# Patient Record
Sex: FEMALE | Race: WHITE | NOT HISPANIC OR LATINO | Employment: OTHER | URBAN - NONMETROPOLITAN AREA
[De-identification: names, ages, dates, MRNs, and addresses within clinical notes are randomized per-mention and may not be internally consistent; named-entity substitution may affect disease eponyms.]

---

## 2024-06-20 ENCOUNTER — OFFICE VISIT (OUTPATIENT)
Dept: INTERNAL MEDICINE | Facility: CLINIC | Age: 69
End: 2024-06-20
Payer: MEDICARE

## 2024-06-20 ENCOUNTER — TELEPHONE (OUTPATIENT)
Dept: INTERNAL MEDICINE | Facility: CLINIC | Age: 69
End: 2024-06-20

## 2024-06-20 VITALS
HEART RATE: 69 BPM | OXYGEN SATURATION: 99 % | BODY MASS INDEX: 28.58 KG/M2 | TEMPERATURE: 97.7 F | HEIGHT: 61 IN | SYSTOLIC BLOOD PRESSURE: 158 MMHG | DIASTOLIC BLOOD PRESSURE: 98 MMHG | WEIGHT: 151.4 LBS

## 2024-06-20 DIAGNOSIS — F33.9 RECURRENT MAJOR DEPRESSIVE DISORDER, REMISSION STATUS UNSPECIFIED: Primary | ICD-10-CM

## 2024-06-20 DIAGNOSIS — N18.31 STAGE 3A CHRONIC KIDNEY DISEASE: ICD-10-CM

## 2024-06-20 DIAGNOSIS — F41.1 GENERALIZED ANXIETY DISORDER: ICD-10-CM

## 2024-06-20 DIAGNOSIS — R41.3 MEMORY LOSS: ICD-10-CM

## 2024-06-20 DIAGNOSIS — H93.13 TINNITUS OF BOTH EARS: ICD-10-CM

## 2024-06-20 DIAGNOSIS — G47.9 SLEEP DISORDER: ICD-10-CM

## 2024-06-20 DIAGNOSIS — I10 ESSENTIAL HYPERTENSION: ICD-10-CM

## 2024-06-20 DIAGNOSIS — Z12.11 ENCOUNTER FOR SCREENING FOR MALIGNANT NEOPLASM OF COLON: ICD-10-CM

## 2024-06-20 PROBLEM — F32.9 MAJOR DEPRESSIVE DISORDER: Status: ACTIVE | Noted: 2024-06-20

## 2024-06-20 PROCEDURE — 1126F AMNT PAIN NOTED NONE PRSNT: CPT | Performed by: INTERNAL MEDICINE

## 2024-06-20 PROCEDURE — 3080F DIAST BP >= 90 MM HG: CPT | Performed by: INTERNAL MEDICINE

## 2024-06-20 PROCEDURE — 3077F SYST BP >= 140 MM HG: CPT | Performed by: INTERNAL MEDICINE

## 2024-06-20 PROCEDURE — 99214 OFFICE O/P EST MOD 30 MIN: CPT | Performed by: INTERNAL MEDICINE

## 2024-06-20 RX ORDER — CLONIDINE HYDROCHLORIDE 0.1 MG/1
TABLET ORAL
COMMUNITY

## 2024-06-20 RX ORDER — SERTRALINE HYDROCHLORIDE 25 MG/1
25 TABLET, FILM COATED ORAL DAILY
Qty: 14 TABLET | Refills: 0 | Status: SHIPPED | OUTPATIENT
Start: 2024-06-20

## 2024-06-20 RX ORDER — GLYCOPYRROLATE 1 MG/1
TABLET ORAL
COMMUNITY

## 2024-06-20 RX ORDER — ERGOCALCIFEROL 1.25 MG/1
CAPSULE ORAL
COMMUNITY
Start: 2024-05-06

## 2024-06-20 RX ORDER — BUSPIRONE HYDROCHLORIDE 5 MG/1
TABLET ORAL
COMMUNITY

## 2024-06-20 RX ORDER — SERTRALINE HYDROCHLORIDE 25 MG/1
25 TABLET, FILM COATED ORAL DAILY
Qty: 14 TABLET | Refills: 0 | Status: SHIPPED | OUTPATIENT
Start: 2024-06-20 | End: 2024-06-20

## 2024-06-20 RX ORDER — LOSARTAN POTASSIUM 25 MG/1
1 TABLET ORAL DAILY
COMMUNITY

## 2024-06-20 RX ORDER — BUDESONIDE, GLYCOPYRROLATE, AND FORMOTEROL FUMARATE 160; 9; 4.8 UG/1; UG/1; UG/1
2 AEROSOL, METERED RESPIRATORY (INHALATION) 2 TIMES DAILY
Qty: 10.7 G | Refills: 2 | Status: SHIPPED | OUTPATIENT
Start: 2024-06-20

## 2024-06-26 ENCOUNTER — TELEPHONE (OUTPATIENT)
Dept: INTERNAL MEDICINE | Facility: CLINIC | Age: 69
End: 2024-06-26

## 2024-07-10 ENCOUNTER — TELEPHONE (OUTPATIENT)
Dept: INTERNAL MEDICINE | Facility: CLINIC | Age: 69
End: 2024-07-10

## 2024-07-10 DIAGNOSIS — H93.13 TINNITUS OF BOTH EARS: Primary | ICD-10-CM

## 2024-07-18 ENCOUNTER — OFFICE VISIT (OUTPATIENT)
Dept: INTERNAL MEDICINE | Facility: CLINIC | Age: 69
End: 2024-07-18
Payer: MEDICARE

## 2024-07-18 VITALS
WEIGHT: 151.6 LBS | OXYGEN SATURATION: 99 % | HEART RATE: 57 BPM | HEIGHT: 61 IN | TEMPERATURE: 97.3 F | DIASTOLIC BLOOD PRESSURE: 86 MMHG | BODY MASS INDEX: 28.62 KG/M2 | SYSTOLIC BLOOD PRESSURE: 142 MMHG

## 2024-07-18 DIAGNOSIS — F41.1 GENERALIZED ANXIETY DISORDER: ICD-10-CM

## 2024-07-18 DIAGNOSIS — I10 ESSENTIAL HYPERTENSION: ICD-10-CM

## 2024-07-18 DIAGNOSIS — N18.31 STAGE 3A CHRONIC KIDNEY DISEASE: Primary | ICD-10-CM

## 2024-07-18 DIAGNOSIS — F33.9 RECURRENT MAJOR DEPRESSIVE DISORDER, REMISSION STATUS UNSPECIFIED: ICD-10-CM

## 2024-07-18 DIAGNOSIS — R41.3 MEMORY LOSS: ICD-10-CM

## 2024-07-18 PROCEDURE — 1126F AMNT PAIN NOTED NONE PRSNT: CPT | Performed by: INTERNAL MEDICINE

## 2024-07-18 PROCEDURE — 3077F SYST BP >= 140 MM HG: CPT | Performed by: INTERNAL MEDICINE

## 2024-07-18 PROCEDURE — 3079F DIAST BP 80-89 MM HG: CPT | Performed by: INTERNAL MEDICINE

## 2024-07-18 PROCEDURE — G2211 COMPLEX E/M VISIT ADD ON: HCPCS | Performed by: INTERNAL MEDICINE

## 2024-07-18 PROCEDURE — 99214 OFFICE O/P EST MOD 30 MIN: CPT | Performed by: INTERNAL MEDICINE

## 2024-07-18 RX ORDER — GLYCOPYRROLATE 1 MG/1
1 TABLET ORAL 3 TIMES DAILY PRN
Qty: 90 TABLET | Refills: 0 | Status: SHIPPED | OUTPATIENT
Start: 2024-07-18

## 2024-07-18 RX ORDER — LOSARTAN POTASSIUM 25 MG/1
25 TABLET ORAL 2 TIMES DAILY
Qty: 60 TABLET | Refills: 2 | Status: SHIPPED | OUTPATIENT
Start: 2024-07-18

## 2024-09-09 ENCOUNTER — OFFICE VISIT (OUTPATIENT)
Dept: INTERNAL MEDICINE | Facility: CLINIC | Age: 69
End: 2024-09-09
Payer: MEDICARE

## 2024-09-09 VITALS
DIASTOLIC BLOOD PRESSURE: 72 MMHG | TEMPERATURE: 98.2 F | HEIGHT: 61 IN | WEIGHT: 154 LBS | OXYGEN SATURATION: 99 % | HEART RATE: 73 BPM | BODY MASS INDEX: 29.07 KG/M2 | SYSTOLIC BLOOD PRESSURE: 142 MMHG

## 2024-09-09 DIAGNOSIS — J06.9 ACUTE UPPER RESPIRATORY INFECTION: Primary | ICD-10-CM

## 2024-09-09 DIAGNOSIS — I10 ESSENTIAL HYPERTENSION: ICD-10-CM

## 2024-09-09 DIAGNOSIS — J30.1 SEASONAL ALLERGIC RHINITIS DUE TO POLLEN: ICD-10-CM

## 2024-09-09 DIAGNOSIS — N18.31 STAGE 3A CHRONIC KIDNEY DISEASE: ICD-10-CM

## 2024-09-09 PROCEDURE — 3078F DIAST BP <80 MM HG: CPT | Performed by: INTERNAL MEDICINE

## 2024-09-09 PROCEDURE — 96372 THER/PROPH/DIAG INJ SC/IM: CPT | Performed by: INTERNAL MEDICINE

## 2024-09-09 PROCEDURE — 3077F SYST BP >= 140 MM HG: CPT | Performed by: INTERNAL MEDICINE

## 2024-09-09 PROCEDURE — 87426 SARSCOV CORONAVIRUS AG IA: CPT | Performed by: INTERNAL MEDICINE

## 2024-09-09 PROCEDURE — 99214 OFFICE O/P EST MOD 30 MIN: CPT | Performed by: INTERNAL MEDICINE

## 2024-09-09 PROCEDURE — 1126F AMNT PAIN NOTED NONE PRSNT: CPT | Performed by: INTERNAL MEDICINE

## 2024-09-09 RX ORDER — FLUTICASONE PROPIONATE 50 MCG
2 SPRAY, SUSPENSION (ML) NASAL DAILY
Qty: 9.9 G | Refills: 2 | Status: SHIPPED | OUTPATIENT
Start: 2024-09-09

## 2024-09-09 RX ORDER — AZITHROMYCIN 250 MG/1
TABLET, FILM COATED ORAL
Qty: 6 TABLET | Refills: 0 | Status: SHIPPED | OUTPATIENT
Start: 2024-09-09

## 2024-09-09 RX ORDER — METHYLPREDNISOLONE 4 MG
TABLET, DOSE PACK ORAL
Qty: 21 TABLET | Refills: 0 | Status: SHIPPED | OUTPATIENT
Start: 2024-09-09

## 2024-09-09 RX ORDER — METHYLPREDNISOLONE SODIUM SUCCINATE 40 MG/ML
40 INJECTION, POWDER, LYOPHILIZED, FOR SOLUTION INTRAMUSCULAR; INTRAVENOUS ONCE
Status: COMPLETED | OUTPATIENT
Start: 2024-09-09 | End: 2024-09-09

## 2024-09-09 RX ADMIN — METHYLPREDNISOLONE SODIUM SUCCINATE 40 MG: 40 INJECTION, POWDER, LYOPHILIZED, FOR SOLUTION INTRAMUSCULAR; INTRAVENOUS at 07:08

## 2024-09-10 LAB
EXPIRATION DATE: NORMAL
INTERNAL CONTROL: NORMAL
Lab: NORMAL
SARS-COV-2 AG UPPER RESP QL IA.RAPID: NOT DETECTED

## 2024-10-23 ENCOUNTER — OFFICE VISIT (OUTPATIENT)
Dept: INTERNAL MEDICINE | Facility: CLINIC | Age: 69
End: 2024-10-23
Payer: MEDICARE

## 2024-10-23 VITALS
HEART RATE: 72 BPM | BODY MASS INDEX: 29.27 KG/M2 | SYSTOLIC BLOOD PRESSURE: 170 MMHG | OXYGEN SATURATION: 97 % | WEIGHT: 155 LBS | DIASTOLIC BLOOD PRESSURE: 98 MMHG | TEMPERATURE: 98.4 F | HEIGHT: 61 IN

## 2024-10-23 DIAGNOSIS — Z78.0 POST-MENOPAUSAL: ICD-10-CM

## 2024-10-23 DIAGNOSIS — Z12.31 ENCOUNTER FOR SCREENING MAMMOGRAM FOR BREAST CANCER: ICD-10-CM

## 2024-10-23 DIAGNOSIS — N18.31 STAGE 3A CHRONIC KIDNEY DISEASE: ICD-10-CM

## 2024-10-23 DIAGNOSIS — Z23 NEED FOR PNEUMOCOCCAL 20-VALENT CONJUGATE VACCINATION: ICD-10-CM

## 2024-10-23 DIAGNOSIS — F33.9 RECURRENT MAJOR DEPRESSIVE DISORDER, REMISSION STATUS UNSPECIFIED: ICD-10-CM

## 2024-10-23 DIAGNOSIS — I10 ESSENTIAL HYPERTENSION: ICD-10-CM

## 2024-10-23 DIAGNOSIS — G47.9 SLEEP DISORDER: ICD-10-CM

## 2024-10-23 DIAGNOSIS — Z23 FLU VACCINE NEED: Primary | ICD-10-CM

## 2024-10-23 PROCEDURE — 1170F FXNL STATUS ASSESSED: CPT | Performed by: INTERNAL MEDICINE

## 2024-10-23 PROCEDURE — 3077F SYST BP >= 140 MM HG: CPT | Performed by: INTERNAL MEDICINE

## 2024-10-23 PROCEDURE — 90662 IIV NO PRSV INCREASED AG IM: CPT | Performed by: INTERNAL MEDICINE

## 2024-10-23 PROCEDURE — G0439 PPPS, SUBSEQ VISIT: HCPCS | Performed by: INTERNAL MEDICINE

## 2024-10-23 PROCEDURE — G0008 ADMIN INFLUENZA VIRUS VAC: HCPCS | Performed by: INTERNAL MEDICINE

## 2024-10-23 PROCEDURE — 90677 PCV20 VACCINE IM: CPT | Performed by: INTERNAL MEDICINE

## 2024-10-23 PROCEDURE — G0009 ADMIN PNEUMOCOCCAL VACCINE: HCPCS | Performed by: INTERNAL MEDICINE

## 2024-10-23 PROCEDURE — 99213 OFFICE O/P EST LOW 20 MIN: CPT | Performed by: INTERNAL MEDICINE

## 2024-10-23 PROCEDURE — 1126F AMNT PAIN NOTED NONE PRSNT: CPT | Performed by: INTERNAL MEDICINE

## 2024-10-23 PROCEDURE — 3080F DIAST BP >= 90 MM HG: CPT | Performed by: INTERNAL MEDICINE

## 2024-10-23 RX ORDER — TEMAZEPAM 7.5 MG/1
7.5 CAPSULE ORAL NIGHTLY PRN
Qty: 30 CAPSULE | Refills: 2 | Status: SHIPPED | OUTPATIENT
Start: 2024-10-23

## 2024-11-06 ENCOUNTER — HOSPITAL ENCOUNTER (OUTPATIENT)
Dept: MAMMOGRAPHY | Facility: HOSPITAL | Age: 69
Discharge: HOME | End: 2024-11-06
Payer: MEDICARE

## 2024-11-06 ENCOUNTER — HOSPITAL ENCOUNTER (OUTPATIENT)
Dept: BONE DENSITY | Facility: HOSPITAL | Age: 69
Discharge: HOME | End: 2024-11-06
Payer: MEDICARE

## 2024-11-06 DIAGNOSIS — Z12.31 ENCOUNTER FOR SCREENING MAMMOGRAM FOR BREAST CANCER: ICD-10-CM

## 2024-11-06 DIAGNOSIS — Z78.0 POST-MENOPAUSAL: ICD-10-CM

## 2024-11-06 PROCEDURE — 77063 BREAST TOMOSYNTHESIS BI: CPT

## 2024-11-06 PROCEDURE — 77067 SCR MAMMO BI INCL CAD: CPT

## 2024-11-06 PROCEDURE — 77080 DXA BONE DENSITY AXIAL: CPT

## 2025-01-09 DIAGNOSIS — I10 ESSENTIAL HYPERTENSION: ICD-10-CM

## 2025-01-09 RX ORDER — LOSARTAN POTASSIUM 25 MG/1
25 TABLET ORAL 2 TIMES DAILY
Qty: 180 TABLET | Refills: 2 | Status: SHIPPED | OUTPATIENT
Start: 2025-01-09

## 2025-01-31 ENCOUNTER — TELEPHONE (OUTPATIENT)
Dept: INTERNAL MEDICINE | Facility: CLINIC | Age: 70
End: 2025-01-31

## 2025-01-31 DIAGNOSIS — I10 ESSENTIAL HYPERTENSION: Primary | ICD-10-CM

## 2025-02-03 LAB
ALBUMIN SERPL-MCNC: 4.3 G/DL (ref 3.5–5.2)
ALBUMIN/GLOB SERPL: 1.6 G/DL
ALP SERPL-CCNC: 96 U/L (ref 39–117)
ALT SERPL-CCNC: 20 U/L (ref 1–33)
AST SERPL-CCNC: 20 U/L (ref 1–32)
BILIRUB SERPL-MCNC: 0.5 MG/DL (ref 0–1.2)
BUN SERPL-MCNC: 13 MG/DL (ref 8–23)
BUN/CREAT SERPL: 10.7 (ref 7–25)
CALCIUM SERPL-MCNC: 9.9 MG/DL (ref 8.6–10.5)
CHLORIDE SERPL-SCNC: 103 MMOL/L (ref 98–107)
CO2 SERPL-SCNC: 26.4 MMOL/L (ref 22–29)
CREAT SERPL-MCNC: 1.21 MG/DL (ref 0.57–1)
EGFRCR SERPLBLD CKD-EPI 2021: 48.3 ML/MIN/1.73
GLOBULIN SER CALC-MCNC: 2.7 GM/DL
GLUCOSE SERPL-MCNC: 84 MG/DL (ref 65–99)
POTASSIUM SERPL-SCNC: 4.6 MMOL/L (ref 3.5–5.2)
PROT SERPL-MCNC: 7 G/DL (ref 6–8.5)
SODIUM SERPL-SCNC: 141 MMOL/L (ref 136–145)

## 2025-02-05 ENCOUNTER — OFFICE VISIT (OUTPATIENT)
Dept: INTERNAL MEDICINE | Facility: CLINIC | Age: 70
End: 2025-02-05
Payer: MEDICARE

## 2025-02-05 VITALS
TEMPERATURE: 97.8 F | BODY MASS INDEX: 28.7 KG/M2 | WEIGHT: 152 LBS | OXYGEN SATURATION: 98 % | HEIGHT: 61 IN | HEART RATE: 59 BPM | DIASTOLIC BLOOD PRESSURE: 98 MMHG | SYSTOLIC BLOOD PRESSURE: 186 MMHG

## 2025-02-05 DIAGNOSIS — F33.9 RECURRENT MAJOR DEPRESSIVE DISORDER, REMISSION STATUS UNSPECIFIED: ICD-10-CM

## 2025-02-05 DIAGNOSIS — T83.711D EROSION OF VAGINAL MESH, SUBSEQUENT ENCOUNTER: ICD-10-CM

## 2025-02-05 DIAGNOSIS — I10 ESSENTIAL HYPERTENSION: ICD-10-CM

## 2025-02-05 DIAGNOSIS — N18.31 STAGE 3A CHRONIC KIDNEY DISEASE: ICD-10-CM

## 2025-02-05 DIAGNOSIS — F41.1 GENERALIZED ANXIETY DISORDER: Primary | ICD-10-CM

## 2025-02-05 DIAGNOSIS — R94.39 ABNORMAL STRESS TEST: ICD-10-CM

## 2025-02-05 PROCEDURE — 3077F SYST BP >= 140 MM HG: CPT | Performed by: INTERNAL MEDICINE

## 2025-02-05 PROCEDURE — 1126F AMNT PAIN NOTED NONE PRSNT: CPT | Performed by: INTERNAL MEDICINE

## 2025-02-05 PROCEDURE — 3080F DIAST BP >= 90 MM HG: CPT | Performed by: INTERNAL MEDICINE

## 2025-02-05 PROCEDURE — G2211 COMPLEX E/M VISIT ADD ON: HCPCS | Performed by: INTERNAL MEDICINE

## 2025-02-05 PROCEDURE — 99214 OFFICE O/P EST MOD 30 MIN: CPT | Performed by: INTERNAL MEDICINE

## 2025-02-06 NOTE — PROGRESS NOTES
"      Chief Complaint  Hypertension (3 month follow up ), needs referral for cardiology  (Due to abnormal stress test done in 2023 in [place] /Was told she may have a blockage ), can sleeping medicaiton be increased , wants referral for psyc  (Does not want to go to [place] ), other (Bowel  movements come though vagina more so than rectum. Has been at least 3 years since seeing gyn/Is ok with [place] - as that is who she has seen before ), and Shoulder Injury (Right shoulder pain )    Subjective        Cindy SARAH presents to [facility]    HPI    Patient here for the above problems.  See Assessment and Plan for further HPI components.      Review of Systems    Objective   Vital Signs:  BP (!) 186/98 (BP Location: Left arm, Patient Position: Sitting, Cuff Size: Adult) Comment: has not had BP medication this morning  Pulse 59   Temp 97.8 °F (36.6 °C) (Temporal)   Ht 154.9 cm (61\")   Wt 68.9 kg (152 lb)   SpO2 98%   BMI 28.72 kg/m²   Estimated body mass index is 28.72 kg/m² as calculated from the following:    Height as of this encounter: 154.9 cm (61\").    Weight as of this encounter: 68.9 kg (152 lb).      Physical Exam  Vitals and nursing note reviewed.   Constitutional:       Appearance: She is not ill-appearing.   Eyes:      General: No scleral icterus.     Conjunctiva/sclera: Conjunctivae normal.   Pulmonary:      Effort: Pulmonary effort is normal. No respiratory distress.   Skin:     General: Skin is warm.      Coloration: Skin is not pale.   Neurological:      General: No focal deficit present.      Mental Status: She is alert and oriented to person, place, and time.   Psychiatric:         Mood and Affect: Mood normal.         Behavior: Behavior normal.                       Assessment and Plan   Diagnoses and all orders for this visit:    1. Generalized anxiety disorder (Primary)  -     Ambulatory Referral to Psychiatry    2. Recurrent major depressive disorder, " "remission status unspecified  -     Ambulatory Referral to Psychiatry    3. Abnormal stress test  -     Ambulatory Referral to Cardiology    4. Erosion of vaginal mesh, subsequent encounter  -     Ambulatory Referral to Obstetrics / Gynecology    5. Stage 3a chronic kidney disease    6. Essential hypertension        History of Present Illness  The patient is a 69-year-old female who presents for evaluation of chest pain, blood pressure management, stress level, and stool coming out of the vagina.    She underwent a stress test at [facility] in [place], [place], under the supervision of Dr. Morrison. The results indicated \"some abnormalities, suggesting a potential blockage\", even though she \"did not have a heart attack.\" She was advised to consult a cardiologist or undergo further testing but opted to return home.  She has not told anyone about this until today, will try to get the records from this. She has expressed a preference for Dr. Kebede, given his previous successful treatment of her  and nephew. She reports intermittent chest discomfort, similar to the symptoms experienced during her hospital stay. On a few occasions, she contemplated seeking emergency care but decided against it as the discomfort subsided after resting in a chair. She is not experiencing any chest pain currently but admits to occasional shortness of breath.    She has been experiencing an unusual symptom of stool passing through her vagina, which has occurred three times this month. She has previously consulted with Dr. Danielle, who referred her to [facility] for surgery. Post-surgery, she experienced chest pain, which alleviated the pain. She was subsequently advised to undergo a stress test the following day. She has also consulted with Dr. العراقي, a gynecologist, on two occasions, once as an outpatient and once in his office.  She reports she thinks she was supposed to get a second surgery but the " stress test put a hold on that.      She admits to forgetting her blood pressure medication this morning, a lapse that occurs every other day. She manages her pain without resorting to ibuprofen or Aleve, occasionally using Tylenol, which provides minimal relief. She acknowledges a need to increase her water intake.    She is seeking a referral to a psychiatrist to help manage her stress levels. She reports difficulty sleeping due to an inability to quiet her mind. 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    MEDICATIONS  Current: Tylenol      Assessment & Plan  1. Chest pain.  The patient reports intermittent chest pain, similar to previous episodes that resolved without emergency intervention. A recent stress test at [facility] indicated abnormalities suggestive of a possible blockage. A referral to cardiology will be initiated, preferably to Dr. Kebede, for further evaluation and management. The stress test results will be obtained and reviewed.  Will refer to Dr. Kebede per patient request and story of abnormal stress test.     2. Stool coming out of the vagina. Abnormal mesh.   The patient reports experiencing stool passing through the vagina intermittently, with three episodes this month. A referral to Dr. العراقي, a gynecologist, will be made for further evaluation and management.    3. Blood pressure management. Hypertension. CKD 3a.  The patient's kidney function is deteriorating, potentially due to uncontrolled blood pressure. She admits to frequently forgetting to take her blood pressure medication. She is advised to ensure consistent intake of her blood pressure medication and to increase her water consumption. A copy of her recent kidney function tests will be provided for her records.  Poor compliance with medications.  Focus on hydration.   She follows with nephrology.     4. Stress level.  The patient reports high stress levels, difficulty sleeping, and financial strain. She is advised to seek psychiatric support to help manage her stress. Recommendations include contacting [facility] or [facility] for psychiatric services. Increasing sleep medication is not recommended due to worsening kidney function.    5. Insomnia  Due to her memory issues and decline in renal function will not increase restoril as she has requested.      PROCEDURE  The patient underwent bladder surgery at [facility].        Result Review :  The following data was reviewed by: David Ferris MD on 02/05/2025:  CMP          10/21/2024    07:34 2/3/2025    07:44   CMP   Glucose 91  84    BUN 9  13    Creatinine 1.25  1.21    Sodium 141  141    Potassium 4.7  4.6    Chloride 104  103    Calcium 9.9  9.9    Total Protein 6.7  7.0    Albumin 4.2  4.3    Globulin 2.5  2.7    Total Bilirubin 0.8  0.5    Alkaline Phosphatase 87  96    AST (SGOT) 20  20    ALT (SGPT) 15  20    BUN/Creatinine Ratio 7.2  10.7      CBC w/diff          10/21/2024    07:34   CBC w/Diff   WBC 5.70    RBC 4.91    Hemoglobin 16.0    Hematocrit 45.9    MCV 93.5    MCH 32.6    MCHC 34.9    RDW 12.1    Platelets 247    Neutrophil Rel % 57.7    Lymphocyte Rel % 26.8    Monocyte Rel % 8.8    Eosinophil Rel % 5.1    Basophil Rel % 1.4      Lipid Panel          10/21/2024    07:34   Lipid Panel   Total Cholesterol 215    Triglycerides 83    HDL Cholesterol 112    VLDL Cholesterol 14    LDL Cholesterol  89      TSH          10/21/2024    07:34   TSH   TSH 3.140          UA          10/21/2024    07:34   Urinalysis   Blood, UA Negative    Leukocytes, UA Negative    Nitrite, UA Negative    RBC, UA 0-2    Bacteria, UA Comment                               Follow Up   Return in about 3 months (around 5/5/2025), or if symptoms worsen or fail to improve, for follow up for above problems. Longitudinal  care..  Patient was given instructions and counseling regarding her condition or for health maintenance advice. Please see specific information pulled into the AVS if appropriate.       ROSALINDA Ferris MD, FACP, [facility]      Electronically signed by David Ferris MD, 02/06/25, 1:02 PM CST.    Patient or patient representative verbalized consent for the use of Ambient Listening during the visit with  David Ferris MD for chart documentation. 2/6/2025  13:05 CST

## 2025-02-17 ENCOUNTER — TELEPHONE (OUTPATIENT)
Dept: INTERNAL MEDICINE | Facility: CLINIC | Age: 70
End: 2025-02-17
Payer: MEDICARE

## 2025-02-17 NOTE — TELEPHONE ENCOUNTER
Patient returned call during lunch.  She is going to work at 12:30 so if she is in a place she cannot  your return call, she will call back tomorrow

## 2025-03-28 ENCOUNTER — OFFICE VISIT (OUTPATIENT)
Dept: CARDIOLOGY | Facility: CLINIC | Age: 70
End: 2025-03-28
Payer: MEDICARE

## 2025-03-28 VITALS
HEART RATE: 68 BPM | WEIGHT: 152 LBS | BODY MASS INDEX: 28.7 KG/M2 | DIASTOLIC BLOOD PRESSURE: 100 MMHG | HEIGHT: 61 IN | OXYGEN SATURATION: 100 % | SYSTOLIC BLOOD PRESSURE: 180 MMHG

## 2025-03-28 DIAGNOSIS — I10 PRIMARY HYPERTENSION: ICD-10-CM

## 2025-03-28 DIAGNOSIS — R07.89 ATYPICAL CHEST PAIN: Primary | ICD-10-CM

## 2025-03-28 PROCEDURE — 1160F RVW MEDS BY RX/DR IN RCRD: CPT | Performed by: INTERNAL MEDICINE

## 2025-03-28 PROCEDURE — 93000 ELECTROCARDIOGRAM COMPLETE: CPT | Performed by: INTERNAL MEDICINE

## 2025-03-28 PROCEDURE — 3077F SYST BP >= 140 MM HG: CPT | Performed by: INTERNAL MEDICINE

## 2025-03-28 PROCEDURE — 3080F DIAST BP >= 90 MM HG: CPT | Performed by: INTERNAL MEDICINE

## 2025-03-28 PROCEDURE — 1159F MED LIST DOCD IN RCRD: CPT | Performed by: INTERNAL MEDICINE

## 2025-03-28 PROCEDURE — 99204 OFFICE O/P NEW MOD 45 MIN: CPT | Performed by: INTERNAL MEDICINE

## 2025-03-28 RX ORDER — PHENOL 1.4 %
1200 AEROSOL, SPRAY (ML) MUCOUS MEMBRANE DAILY
COMMUNITY

## 2025-03-28 RX ORDER — CARVEDILOL 3.12 MG/1
3.12 TABLET ORAL 2 TIMES DAILY
Qty: 60 TABLET | Refills: 11 | Status: SHIPPED | OUTPATIENT
Start: 2025-03-28

## 2025-03-28 NOTE — PROGRESS NOTES
Subjective:     Encounter Date:03/28/2025      Patient ID: Cindy Thorpe is a 69 y.o. female who presents for further evaluation of chest discomfort.    Referring provider: David Ferris MD  Reason for referral: Abnormal stress test    Chief Complaint: Chest discomfort    History of Present Illness    This is a 69-year-old female with hypertension and stage III chronic kidney disease who presents for further evaluation of a reportedly abnormal stress test from August 2023, also with complaints of intermittent chest discomfort.  The patient notes that for quite some time she has been having some intermittent chest discomfort.  This can occur at random times.  It is usually in the substernal area, nonradiating with no associated symptoms.  She was actually hospitalized for abdominal surgery and when she awoke from anesthesia, she was having some chest discomfort.  She was In the hospital and a nuclear stress test was performed.  The stress test results are noted below but the cardiologist at that facility thought that the stress test was abnormal and recommended further testing.  At that time, the patient preferred to follow-up with cardiology more locally and was discharged home.  This was in August 2023.  She recently saw her primary care provider who became aware of this and made referral to our office given the stress test results and due to intermittent chest discomfort.  The patient denies having any new shortness of breath but says that she has chronic mild shortness of breath and dyspnea with exertion.  She also notes some degree of chronic fatigue.  She denies having significant lower extremity edema.  Some intermittent lightheadedness and dizziness but no syncopal episodes noted.  No palpitations reported.  Blood pressure, according to the patient is up-and-down but is rarely less than 140 mmHg.    The following portions of the patient's history were reviewed and updated as appropriate: allergies,  current medications, past family history, past medical history, past social history, past surgical history, and problem list.    Past Medical History:   Diagnosis Date    Anxiety     CKD (chronic kidney disease)     GERD (gastroesophageal reflux disease)     HTN (hypertension)      Past Surgical History:   Procedure Laterality Date    CHOLECYSTECTOMY      REPAIR PELVIC FLOOR DEFECT VAGINAL APPROACH W/ MESH      TOTAL ABDOMINAL HYSTERECTOMY WITH SALPINGO OOPHORECTOMY  1986    Endometreosis, enlarged cysts.       Current Outpatient Medications:     Budeson-Glycopyrrol-Formoterol (Breztri Aerosphere) 160-9-4.8 MCG/ACT aerosol inhaler, Inhale 2 puffs 2 (Two) Times a Day., Disp: 10.7 g, Rfl: 2    busPIRone (BUSPAR) 5 MG tablet, TAKE 1 TO 2 TABLETS BY MOUTH 2 TIMES A DAY AS NEEDED FOR ANXIETY, Disp: , Rfl:     calcium carbonate (OS-ABDULKADIR) 600 MG tablet, Take 2 tablets by mouth Daily., Disp: , Rfl:     fluticasone (FLONASE) 50 MCG/ACT nasal spray, 2 sprays into the nostril(s) as directed by provider Daily., Disp: 9.9 g, Rfl: 2    glycopyrrolate (ROBINUL) 1 MG tablet, Take 1 tablet by mouth 3 (Three) Times a Day As Needed (dizziness)., Disp: 90 tablet, Rfl: 0    losartan (COZAAR) 25 MG tablet, TAKE ONE TABLET BY MOUTH TWICE DAILY (Patient taking differently: Take 2 tablets by mouth 2 (Two) Times a Day.), Disp: 180 tablet, Rfl: 2    omeprazole (priLOSEC) 20 MG capsule, Take 1 capsule by mouth Daily., Disp: , Rfl:     temazepam (Restoril) 7.5 MG capsule, Take 1 capsule by mouth At Night As Needed for Sleep., Disp: 30 capsule, Rfl: 2    vitamin D (ERGOCALCIFEROL) 1.25 MG (62031 UT) capsule capsule, Take 1 capsule by mouth Every 7 (Seven) Days., Disp: , Rfl:     carvedilol (COREG) 3.125 MG tablet, Take 1 tablet by mouth 2 (Two) Times a Day., Disp: 60 tablet, Rfl: 11    Allergies   Allergen Reactions    Methylprednisolone Unknown - Low Severity     Redness     Wasp Venom Swelling     Social History     Tobacco Use    Smoking  status: Never    Smokeless tobacco: Never   Substance Use Topics    Alcohol use: Yes     Comment: social     Family History   Problem Relation Age of Onset    Diabetes Mother     Heart disease Mother     Rectal cancer Sister     Liver cancer Sister     Lung cancer Brother     Heart disease Brother     Cervical cancer Maternal Grandmother     Lung cancer Maternal Grandfather     Stroke Maternal Grandfather     Cancer Paternal Grandmother     Cancer Maternal Aunt     Lung cancer Paternal Uncle     Breast cancer Neg Hx      Review of Systems   Constitutional: Positive for malaise/fatigue.   Cardiovascular:  Positive for chest pain and dyspnea on exertion. Negative for leg swelling, orthopnea, palpitations, paroxysmal nocturnal dyspnea and syncope.   Respiratory:  Positive for shortness of breath. Negative for cough and wheezing.    Endocrine: Negative for cold intolerance and heat intolerance.   Hematologic/Lymphatic: Negative for bleeding problem. Does not bruise/bleed easily.   Gastrointestinal:  Negative for abdominal pain, nausea and vomiting.   Neurological:  Positive for dizziness and light-headedness.   All other systems reviewed and are negative.      ECG 12 Lead    Date/Time: 3/28/2025 8:49 AM  Performed by: Yimi Kebede MD    Authorized by: Yimi Kebede MD  Comparison: compared with previous ECG from 8/8/2023  Similar to previous ECG  Rhythm: sinus rhythm  Rate: normal  BPM: 62  Conduction: conduction normal  QRS axis: normal  Other findings: non-specific ST-T wave changes and poor R wave progression    Clinical impression: abnormal EKG           Objective:     Vitals reviewed.   Constitutional:       General: Not in acute distress.     Appearance: Well-developed and not in distress.   HENT:      Head: Normocephalic and atraumatic.   Neck:      Vascular: No carotid bruit or JVD.   Pulmonary:      Effort: Pulmonary effort is normal.      Breath sounds: Normal breath sounds. No wheezing.  "No rhonchi. No rales.   Cardiovascular:      Normal rate. Regular rhythm.      Murmurs: There is no murmur.      No gallop.    Pulses:     Intact distal pulses.   Edema:     Peripheral edema absent.   Abdominal:      General: Bowel sounds are normal. There is no distension.      Palpations: Abdomen is soft.      Tenderness: There is no abdominal tenderness.   Skin:     General: Skin is warm and dry. There is no cyanosis.      Findings: No erythema or rash.   Neurological:      Mental Status: Alert. Mental status is at baseline.     /100   Pulse 68   Ht 154.9 cm (61\")   Wt 68.9 kg (152 lb)   SpO2 100%   BMI 28.72 kg/m²     Data/Lab Review:     Lab Results   Component Value Date    CHLPL 215 (H) 10/21/2024    TRIG 83 10/21/2024     (H) 10/21/2024    LDL 89 10/21/2024     Nuclear stress test at [facility] 8/2023:          Assessment:          Diagnosis Plan   1. Atypical chest pain  Stress Test With Myocardial Perfusion One Day      2. Primary hypertension  carvedilol (COREG) 3.125 MG tablet           Plan:       1.  Atypical chest pain: The patient's chest discomfort is atypical.  In response to her stress test, the stress test actually showed normal perfusion and no ECG changes concerning for ischemia.  The patient did report chest pain with exertion, however was also hypertensive.  The more sensitive and specific findings on the stress test, however would be surrounding the patient's perfusion and ECG changes with reports of chest pain.  Despite reports of chest pain, this patient had no objective signs of ischemia, including no ECG changes and normal perfusion.  Therefore, this likely represents a low risk stress test, as reported, however the stress test was in August 2023.  The patient is still having intermittent chest discomfort at this time.  I suspect that this is likely related to her elevated blood pressure readings, which are consistently quite elevated.  I think that she needs further blood " pressure control but I do not think that another stress test is unreasonable at this time.  Therefore, I will order a Lexiscan nuclear stress test.    2.  Primary hypertension: As stated above, the patient's blood pressure is not at goal at this time.  She simply needs another agent, therefore I will add carvedilol 3.125 mg twice daily.  This can be titrated further if needed as long as heart rate will allow.  She will continue losartan at current dosing.    Further plans will be pending the results of the patient's upcoming nuclear stress test.

## 2025-04-28 ENCOUNTER — HOSPITAL ENCOUNTER (OUTPATIENT)
Dept: CARDIOLOGY | Facility: HOSPITAL | Age: 70
Discharge: HOME | End: 2025-04-28
Payer: MEDICARE

## 2025-04-28 VITALS
HEART RATE: 60 BPM | BODY MASS INDEX: 28.89 KG/M2 | SYSTOLIC BLOOD PRESSURE: 138 MMHG | WEIGHT: 153 LBS | DIASTOLIC BLOOD PRESSURE: 89 MMHG | HEIGHT: 61 IN

## 2025-04-28 DIAGNOSIS — R07.89 ATYPICAL CHEST PAIN: ICD-10-CM

## 2025-04-28 PROCEDURE — 78452 HT MUSCLE IMAGE SPECT MULT: CPT

## 2025-04-28 PROCEDURE — 25010000002 REGADENOSON 0.4 MG/5ML SOLUTION: Performed by: INTERNAL MEDICINE

## 2025-04-28 PROCEDURE — 34310000005 TECHNETIUM TETROFOSMIN KIT: Performed by: INTERNAL MEDICINE

## 2025-04-28 PROCEDURE — A9502 TC99M TETROFOSMIN: HCPCS | Performed by: INTERNAL MEDICINE

## 2025-04-28 PROCEDURE — 78452 HT MUSCLE IMAGE SPECT MULT: CPT | Performed by: INTERNAL MEDICINE

## 2025-04-28 PROCEDURE — 93017 CV STRESS TEST TRACING ONLY: CPT

## 2025-04-28 PROCEDURE — 93016 CV STRESS TEST SUPVJ ONLY: CPT | Performed by: INTERNAL MEDICINE

## 2025-04-28 PROCEDURE — 93018 CV STRESS TEST I&R ONLY: CPT | Performed by: INTERNAL MEDICINE

## 2025-04-28 RX ORDER — REGADENOSON 0.08 MG/ML
0.4 INJECTION, SOLUTION INTRAVENOUS ONCE
Status: COMPLETED | OUTPATIENT
Start: 2025-04-28 | End: 2025-04-28

## 2025-04-28 RX ADMIN — TETROFOSMIN 1 DOSE: 1.38 INJECTION, POWDER, LYOPHILIZED, FOR SOLUTION INTRAVENOUS at 10:50

## 2025-04-28 RX ADMIN — REGADENOSON 0.4 MG: 0.08 INJECTION, SOLUTION INTRAVENOUS at 10:42

## 2025-04-28 RX ADMIN — TETROFOSMIN 1 DOSE: 1.38 INJECTION, POWDER, LYOPHILIZED, FOR SOLUTION INTRAVENOUS at 09:00

## 2025-04-29 DIAGNOSIS — G47.9 SLEEP DISORDER: ICD-10-CM

## 2025-04-29 LAB
BH CV NUCLEAR PRIOR STUDY: 3
BH CV REST NUCLEAR ISOTOPE DOSE: 11.8 MCI
BH CV STRESS BP STAGE 1: NORMAL
BH CV STRESS COMMENTS STAGE 1: NORMAL
BH CV STRESS DOSE REGADENOSON STAGE 1: 0.4
BH CV STRESS DURATION MIN STAGE 1: 0
BH CV STRESS DURATION SEC STAGE 1: 10
BH CV STRESS HR STAGE 1: 82
BH CV STRESS NUCLEAR ISOTOPE DOSE: 36 MCI
BH CV STRESS PROTOCOL 1: NORMAL
BH CV STRESS RECOVERY BP: NORMAL MMHG
BH CV STRESS RECOVERY HR: 65 BPM
BH CV STRESS STAGE 1: 1
MAXIMAL PREDICTED HEART RATE: 150 BPM
PERCENT MAX PREDICTED HR: 56.67 %
SPECT HRT GATED+EF W RNC IV: 87 %
STRESS BASELINE BP: NORMAL MMHG
STRESS BASELINE HR: 60 BPM
STRESS PERCENT HR: 67 %
STRESS POST EXERCISE DUR MIN: 0 MIN
STRESS POST EXERCISE DUR SEC: 10 SEC
STRESS POST PEAK BP: NORMAL MMHG
STRESS POST PEAK HR: 85 BPM
STRESS TARGET HR: 128 BPM

## 2025-04-29 RX ORDER — TEMAZEPAM 7.5 MG/1
7.5 CAPSULE ORAL NIGHTLY PRN
Qty: 30 CAPSULE | Refills: 2 | Status: SHIPPED | OUTPATIENT
Start: 2025-04-29

## 2025-04-29 NOTE — TELEPHONE ENCOUNTER
Caller: Cindy Thorpe    Relationship: Self    Best call back number:     [phone]        What is the best time to reach you: ANYTIME     Who are you requesting to speak with (clinical staff, provider,  specific staff member): CLINICAL     Do you know the name of the person who called: CLINICAL     What was the call regarding: SHE STATES SHE HAS A REFERRAL TO THE GYNECOLOGIST AND SHE NEEDS TO KNOW THE INFORMATION     Is it okay if the provider responds through MyChart: CALL BACK REQUEST

## 2025-04-30 NOTE — TELEPHONE ENCOUNTER
Called pt and she was just needing the contact phone number for Dr. Sanchez's office in [place], [place].

## 2025-05-01 DIAGNOSIS — J30.1 SEASONAL ALLERGIC RHINITIS DUE TO POLLEN: ICD-10-CM

## 2025-05-01 RX ORDER — FLUTICASONE PROPIONATE 50 MCG
SPRAY, SUSPENSION (ML) NASAL
Qty: 16 G | Refills: 2 | Status: SHIPPED | OUTPATIENT
Start: 2025-05-01

## 2025-07-17 ENCOUNTER — OFFICE VISIT (OUTPATIENT)
Dept: INTERNAL MEDICINE | Facility: CLINIC | Age: 70
End: 2025-07-17
Payer: MEDICARE

## 2025-07-17 VITALS
TEMPERATURE: 97.8 F | WEIGHT: 153 LBS | OXYGEN SATURATION: 98 % | HEIGHT: 61 IN | SYSTOLIC BLOOD PRESSURE: 128 MMHG | HEART RATE: 74 BPM | DIASTOLIC BLOOD PRESSURE: 72 MMHG | BODY MASS INDEX: 28.89 KG/M2

## 2025-07-17 DIAGNOSIS — F41.1 GENERALIZED ANXIETY DISORDER: ICD-10-CM

## 2025-07-17 DIAGNOSIS — S39.012A STRAIN OF LUMBAR REGION, INITIAL ENCOUNTER: ICD-10-CM

## 2025-07-17 DIAGNOSIS — I10 ESSENTIAL HYPERTENSION: ICD-10-CM

## 2025-07-17 DIAGNOSIS — F33.9 RECURRENT MAJOR DEPRESSIVE DISORDER, REMISSION STATUS UNSPECIFIED: ICD-10-CM

## 2025-07-17 DIAGNOSIS — N18.31 STAGE 3A CHRONIC KIDNEY DISEASE: Primary | ICD-10-CM

## 2025-07-17 DIAGNOSIS — G47.9 SLEEP DISORDER: ICD-10-CM

## 2025-07-17 RX ORDER — KETOROLAC TROMETHAMINE 30 MG/ML
30 INJECTION, SOLUTION INTRAMUSCULAR; INTRAVENOUS ONCE
Status: COMPLETED | OUTPATIENT
Start: 2025-07-17 | End: 2025-07-17

## 2025-07-17 RX ADMIN — KETOROLAC TROMETHAMINE 30 MG: 30 INJECTION, SOLUTION INTRAMUSCULAR; INTRAVENOUS at 14:12

## 2025-07-17 NOTE — PROGRESS NOTES
"      Chief Complaint  Hypertension (3 month follow up ) and Back Pain (Low back pain over the last few weeks /Especially with turning and twisting )    Subjective        Cindy Thorpe presents to [facility]    HPI    Patient here for the above problems.  See Assessment and Plan for further HPI components.      Review of Systems    Objective   Vital Signs:  /72 (BP Location: Left arm, Patient Position: Sitting, Cuff Size: Adult)   Pulse 74   Temp 97.8 °F (36.6 °C) (Temporal)   Ht 154.9 cm (61\")   Wt 69.4 kg (153 lb)   SpO2 98%   BMI 28.91 kg/m²   Estimated body mass index is 28.91 kg/m² as calculated from the following:    Height as of this encounter: 154.9 cm (61\").    Weight as of this encounter: 69.4 kg (153 lb).      Physical Exam  Vitals and nursing note reviewed.   Constitutional:       Appearance: She is not ill-appearing.   Eyes:      General: No scleral icterus.     Conjunctiva/sclera: Conjunctivae normal.   Pulmonary:      Effort: Pulmonary effort is normal. No respiratory distress.   Musculoskeletal:        Back:       Comments: Tenderness across the red line   Skin:     General: Skin is warm.      Coloration: Skin is not pale.   Neurological:      General: No focal deficit present.      Mental Status: She is alert and oriented to person, place, and time.   Psychiatric:         Mood and Affect: Mood normal.         Behavior: Behavior normal.                       Assessment and Plan   Diagnoses and all orders for this visit:    1. Stage 3a chronic kidney disease (Primary)  -     Comprehensive Metabolic Panel; Future  -     Lipid Panel; Future    2. Essential hypertension  -     Comprehensive Metabolic Panel; Future  -     CBC & Differential; Future  -     Lipid Panel; Future  -     TSH Rfx On Abnormal To Free T4; Future    3. Recurrent major depressive disorder, remission status unspecified    4. Generalized anxiety disorder    5. Strain of lumbar region, " initial encounter  -     ketorolac (TORADOL) injection 30 mg    6. Sleep disorder        History of Present Illness  The patient is a 69-year-old female who presents today for follow-up. She has a history of hypertension and chronic kidney disease. The primary reason for this visit is back pain, especially with turning and twisting.    She reports experiencing back pain, particularly when turning or twisting. The pain is described as a burning sensation that does not radiate down her leg. She has not had any recent injuries and does not experience any burning sensation during urination.    Her blood pressure has been well-controlled, with today's reading being the first time it has dropped to 128/72. She monitors her blood pressure at home, which typically ranges in the 130s to 140s, but never exceeds 160. She is on losartan.    Her anxiety levels fluctuate, with some days being better than others. This week, she has been feeling particularly anxious due to issues with her younger son.    Labs prior to next visit as above.    Assessment & Plan  1. Back pain. CKD 3a  Reports back pain, especially with turning and twisting, accompanied by a burning sensation. No radiating pain or burning during urination. Due to chronic kidney disease, NSAIDs are not recommended; an anti-inflammatory injection will be administered. Advised to alternate between applying heat and no heat to the affected area; over-the-counter Salonpas patches recommended for additional relief.  Toradol 30 mg.    2. Hypertension.  Blood pressure is well-controlled at 128/72 mmHg. Currently on losartan; monitoring blood pressure at home with readings generally <160 mmHg. Advised to continue current medication regimen and maintain a low-sodium diet. No chest pain or shortness of breath reported.  Continuye losartan 50 mg twice daily.  Continue carvedilol 3.125 mg BID.    3. Anxiety.  Experiences fluctuations in anxiety levels, with some weeks being better  than others. Recent stressors, particularly related to her younger son, have contributed to increased anxiety. Counseling provided regarding managing stress and anxiety.  Continue buspirone PRN.    4. Sleep disorder  Continue temazepam 7.5 mg daily.  PDMP reviewed.        Result Review :  The following data was reviewed by: David Ferris MD on 07/17/2025:  CMP          10/21/2024    07:34 2/3/2025    07:44   CMP   Glucose 91  84    BUN 9  13    Creatinine 1.25  1.21    EGFR 46.5  48.3    Sodium 141  141    Potassium 4.7  4.6    Chloride 104  103    Calcium 9.9  9.9    Total Protein 6.7  7.0    Albumin 4.2  4.3    Globulin 2.5  2.7    Total Bilirubin 0.8  0.5    Alkaline Phosphatase 87  96    AST (SGOT) 20  20    ALT (SGPT) 15  20    Albumin/Globulin Ratio 1.7  1.6    BUN/Creatinine Ratio 7.2  10.7      BMP          10/21/2024    07:34 2/3/2025    07:44   BMP   BUN 9  13    Creatinine 1.25  1.21    Sodium 141  141    Potassium 4.7  4.6    Chloride 104  103    CO2 27.0  26.4    Calcium 9.9  9.9                   BMI is >= 25 and <30. (Overweight) The following options were offered after discussion;: exercise counseling/recommendations and nutrition counseling/recommendations            Follow Up   Return in about 6 months (around 1/17/2026), or if symptoms worsen or fail to improve, for follow up for above problems. [facility] care., Medicare Wellness - Labs prior to visit.  Patient was given instructions and counseling regarding her condition or for health maintenance advice. Please see specific information pulled into the AVS if appropriate.       ROSALINDA Ferris MD, FACP, [facility]      Electronically signed by David Ferris MD, 07/17/25, 5:30 PM CDT.    Patient or patient representative verbalized consent for the use of Ambient Listening during the visit with  David Ferris MD for chart documentation. 7/17/2025  17:32 CDT